# Patient Record
Sex: MALE | Race: WHITE | NOT HISPANIC OR LATINO | URBAN - METROPOLITAN AREA
[De-identification: names, ages, dates, MRNs, and addresses within clinical notes are randomized per-mention and may not be internally consistent; named-entity substitution may affect disease eponyms.]

---

## 2023-05-04 ENCOUNTER — ON CAMPUS - OUTPATIENT (OUTPATIENT)
Dept: URBAN - METROPOLITAN AREA HOSPITAL 105 | Facility: HOSPITAL | Age: 73
End: 2023-05-04
Payer: MEDICARE

## 2023-05-04 DIAGNOSIS — C25.1 MALIGNANT NEOPLASM OF BODY OF PANCREAS: ICD-10-CM

## 2023-05-04 DIAGNOSIS — K86.89 OTHER SPECIFIED DISEASES OF PANCREAS: ICD-10-CM

## 2023-05-04 PROCEDURE — 43242 EGD US FINE NEEDLE BX/ASPIR: CPT | Performed by: INTERNAL MEDICINE

## 2023-05-08 ENCOUNTER — INPATIENT HOSPITAL (OUTPATIENT)
Dept: URBAN - METROPOLITAN AREA HOSPITAL 104 | Facility: HOSPITAL | Age: 73
End: 2023-05-08
Payer: MEDICARE

## 2023-05-08 DIAGNOSIS — D61.818 OTHER PANCYTOPENIA: ICD-10-CM

## 2023-05-08 DIAGNOSIS — E11.9 TYPE 2 DIABETES MELLITUS WITHOUT COMPLICATIONS: ICD-10-CM

## 2023-05-08 DIAGNOSIS — K83.1 OBSTRUCTION OF BILE DUCT: ICD-10-CM

## 2023-05-08 DIAGNOSIS — C25.1 MALIGNANT NEOPLASM OF BODY OF PANCREAS: ICD-10-CM

## 2023-05-08 PROCEDURE — 99223 1ST HOSP IP/OBS HIGH 75: CPT | Performed by: NURSE PRACTITIONER

## 2023-05-09 ENCOUNTER — INPATIENT HOSPITAL (OUTPATIENT)
Dept: URBAN - METROPOLITAN AREA HOSPITAL 104 | Facility: HOSPITAL | Age: 73
End: 2023-05-09
Payer: MEDICARE

## 2023-05-09 DIAGNOSIS — K83.1 OBSTRUCTION OF BILE DUCT: ICD-10-CM

## 2023-05-09 PROCEDURE — 43274 ERCP DUCT STENT PLACEMENT: CPT | Performed by: INTERNAL MEDICINE

## 2023-05-09 PROCEDURE — 43277 ERCP EA DUCT/AMPULLA DILATE: CPT | Mod: XS | Performed by: INTERNAL MEDICINE

## 2023-05-10 ENCOUNTER — INPATIENT HOSPITAL (OUTPATIENT)
Dept: URBAN - METROPOLITAN AREA HOSPITAL 104 | Facility: HOSPITAL | Age: 73
End: 2023-05-10
Payer: MEDICARE

## 2023-05-10 DIAGNOSIS — C25.1 MALIGNANT NEOPLASM OF BODY OF PANCREAS: ICD-10-CM

## 2023-05-10 DIAGNOSIS — K83.1 OBSTRUCTION OF BILE DUCT: ICD-10-CM

## 2023-05-10 DIAGNOSIS — E11.9 TYPE 2 DIABETES MELLITUS WITHOUT COMPLICATIONS: ICD-10-CM

## 2023-05-10 DIAGNOSIS — D61.818 OTHER PANCYTOPENIA: ICD-10-CM

## 2023-05-10 PROCEDURE — 99232 SBSQ HOSP IP/OBS MODERATE 35: CPT | Performed by: NURSE PRACTITIONER

## 2023-05-27 ENCOUNTER — INPATIENT HOSPITAL (OUTPATIENT)
Dept: URBAN - METROPOLITAN AREA HOSPITAL 104 | Facility: HOSPITAL | Age: 73
End: 2023-05-27
Payer: MEDICARE

## 2023-05-27 DIAGNOSIS — K92.0 HEMATEMESIS: ICD-10-CM

## 2023-05-27 DIAGNOSIS — D62 ACUTE POSTHEMORRHAGIC ANEMIA: ICD-10-CM

## 2023-05-27 DIAGNOSIS — E11.9 TYPE 2 DIABETES MELLITUS WITHOUT COMPLICATIONS: ICD-10-CM

## 2023-05-27 DIAGNOSIS — C25.1 MALIGNANT NEOPLASM OF BODY OF PANCREAS: ICD-10-CM

## 2023-05-27 PROCEDURE — 99222 1ST HOSP IP/OBS MODERATE 55: CPT | Performed by: NURSE PRACTITIONER

## 2023-05-28 PROCEDURE — 99232 SBSQ HOSP IP/OBS MODERATE 35: CPT | Performed by: NURSE PRACTITIONER

## 2023-06-13 ENCOUNTER — OFFICE (OUTPATIENT)
Dept: URBAN - METROPOLITAN AREA CLINIC 16 | Facility: CLINIC | Age: 73
End: 2023-06-13
Payer: MEDICARE

## 2023-06-13 VITALS
WEIGHT: 148 LBS | HEART RATE: 71 BPM | HEIGHT: 70 IN | SYSTOLIC BLOOD PRESSURE: 114 MMHG | OXYGEN SATURATION: 94 % | DIASTOLIC BLOOD PRESSURE: 58 MMHG

## 2023-06-13 DIAGNOSIS — K92.1 MELENA: ICD-10-CM

## 2023-06-13 DIAGNOSIS — C25.1 MALIGNANT NEOPLASM OF BODY OF PANCREAS: ICD-10-CM

## 2023-06-13 PROCEDURE — 99215 OFFICE O/P EST HI 40 MIN: CPT | Performed by: INTERNAL MEDICINE

## 2023-06-13 NOTE — SERVICEHPINOTES
Srinath Mercado   is seen today for a follow-up visit.     Srniath has a history of obstructive jaundice and was seen in consultation at [facility] back in May and noted to have a history of pancreatic mass with CAT scan showing upstream biliary ductal dilatation increased in caliber from previous study in April. 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 Glover and on immunotherapy with history of chemo and radiation in 2022.. Complicating pancytopenia diabetes hyperlipidemia and hypertensionOn review further had elevated bilirubin 9.3 with direct 5.68 AST of 246 ALT of 270 CAT scan showing mass in the pancreatic head and body increased in caliber with upstream dilatation from April 2019. 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 History of right upper lobe adenocarcinoma as well and comorbidities as above. Continue PPI and no plans for endoscopy at that admission. Patient may be a candidate for upper GI study if symptoms persisted per reports and no plan for endoscopy as above
br
brHe did have ascites drained yesterday, 70 cc yesterday

## 2023-06-13 NOTE — SERVICENOTES
I will have him follow back with oncology for management but if he has hemorrhages before and melena then he should return to the hospital.  He has never had a colonoscopy but given the other underlying issues with advanced lesion etc. and management I would hold off on screening and only proceed with absolutely necessary procedure

## 2024-05-23 ENCOUNTER — ON CAMPUS - OUTPATIENT (OUTPATIENT)
Dept: URBAN - METROPOLITAN AREA HOSPITAL 105 | Facility: HOSPITAL | Age: 74
End: 2024-05-23
Payer: MEDICARE

## 2024-05-23 DIAGNOSIS — K80.50 CALCULUS OF BILE DUCT WITHOUT CHOLANGITIS OR CHOLECYSTITIS W: ICD-10-CM

## 2024-05-23 DIAGNOSIS — K83.1 OBSTRUCTION OF BILE DUCT: ICD-10-CM

## 2024-05-23 PROCEDURE — 43264 ERCP REMOVE DUCT CALCULI: CPT | Performed by: INTERNAL MEDICINE
